# Patient Record
Sex: MALE | Race: ASIAN | NOT HISPANIC OR LATINO | URBAN - METROPOLITAN AREA
[De-identification: names, ages, dates, MRNs, and addresses within clinical notes are randomized per-mention and may not be internally consistent; named-entity substitution may affect disease eponyms.]

---

## 2020-02-21 ENCOUNTER — EMERGENCY (EMERGENCY)
Facility: HOSPITAL | Age: 40
LOS: 0 days | Discharge: HOME | End: 2020-02-21
Attending: EMERGENCY MEDICINE | Admitting: EMERGENCY MEDICINE
Payer: COMMERCIAL

## 2020-02-21 VITALS — DIASTOLIC BLOOD PRESSURE: 94 MMHG | SYSTOLIC BLOOD PRESSURE: 166 MMHG

## 2020-02-21 VITALS
SYSTOLIC BLOOD PRESSURE: 172 MMHG | DIASTOLIC BLOOD PRESSURE: 106 MMHG | HEART RATE: 98 BPM | OXYGEN SATURATION: 98 % | RESPIRATION RATE: 17 BRPM | WEIGHT: 171.96 LBS

## 2020-02-21 DIAGNOSIS — R29.810 FACIAL WEAKNESS: ICD-10-CM

## 2020-02-21 DIAGNOSIS — G51.0 BELL'S PALSY: ICD-10-CM

## 2020-02-21 PROCEDURE — 99284 EMERGENCY DEPT VISIT MOD MDM: CPT

## 2020-02-21 RX ADMIN — Medication 60 MILLIGRAM(S): at 18:56

## 2020-02-21 NOTE — ED PROVIDER NOTE - OBJECTIVE STATEMENT
Patient c/o left facial droop with difficulty closing eye, let side. Noted sx 8 am today, C/o pain to left ear past 2 days. No vision changes, no HA, no fever, no rash.

## 2020-02-21 NOTE — ED PROVIDER NOTE - ATTENDING CONTRIBUTION TO CARE
Pt here for L eye weakness and facial droop for 1d.  Recent L ear pain and swelling that resolved with neosporin application.    Exam: unable to raise L eyebrow, L facial droop, otherwise normal neuro exam, RRR, CTAB, NAD  Plan: bell's - tx with steroids/antivirals

## 2020-02-21 NOTE — ED PROVIDER NOTE - NSFOLLOWUPINSTRUCTIONS_ED_ALL_ED_FT
Powell’s Palsy    Bell’s palsy is a condition in which the muscles on one side of the face become paralyzed. This often causes one side of the face to droop. It is a common condition and many people recover completely. Causes include viral infections but most of the time the reason remains unknown. Signs and symptoms include not being able to raise your eyebrow, not being able to close your eye, drooping of the eyelid and corner of the mouth, sensitivity to loud noises, dryness of the eye, change in taste, and not being able to close your mouth and drooling. Take medicines only as directed by your health care provider. If your eye is affected, use moisturizing eye drops to prevent drying of your eye and tape your eyelid shut at night.    SEEK IMMEDIATE MEDICAL CARE IF YOU HAVE ANY OF THE FOLLOWING SYMPTOMS: weakness or numbness in another part of your body, difficulty swallowing, fever, or neck pain.

## 2020-02-21 NOTE — ED PROVIDER NOTE - NSFOLLOWUPCLINICS_GEN_ALL_ED_FT
[facility]  Neurology  [address] [place] [address], [facility] [address]  [place] [address]  Phone: [phone]  Fax:   Follow Up Time:

## 2020-02-21 NOTE — ED PROVIDER NOTE - PATIENT PORTAL LINK FT
You can access the FollowMyHealth Patient Portal offered by [facility] by registering at the following website: [email or web address]. By joining [email or web address]’s FollowMyHealth portal, you will also be able to view your health information using other applications (apps) compatible with our system.

## 2020-02-21 NOTE — ED ADULT TRIAGE NOTE - CHIEF COMPLAINT QUOTE
As per patient "I woke up around 8 (am) today and I brushed my teeth, I noticed I couldn't hold water in my mouth, I have an ear infection that started two days ago".

## 2022-10-03 NOTE — ED ADULT NURSE NOTE - NS PRO PASSIVE SMOKE EXP
Detail Level: Detailed Quality 110: Preventive Care And Screening: Influenza Immunization: Influenza Immunization not Administered because Patient Refused. Quality 130: Documentation Of Current Medications In The Medical Record: Current Medications Documented Unknown

## 2023-05-25 NOTE — ED ADULT TRIAGE NOTE - NS ED TRIAGE AVPU SCALE
Alert-The patient is alert, awake and responds to voice. The patient is oriented to time, place, and person. The triage nurse is able to obtain subjective information. " I am having thyroid surgery "
